# Patient Record
(demographics unavailable — no encounter records)

---

## 2017-06-06 NOTE — ER DOCUMENT REPORT
HPI





- HPI


Patient complains to provider of: laceration


Pain Level: 2


Context: 


patient is a 12 year old male who presents with laceration to the right eyebrow

/ atinet ran into another kid on the play ground. no LOC, h/a, n/v, AMS. UTD on 

vaccinations, pcp is dr solorio





- CARDIOVASCULAR


Cardiovascular: DENIES: Chest pain





- DERM


Skin Color: Normal





Past Medical History





- Social History


Smoking Status: Never Smoker


Chew tobacco use (# tins/day): No


Frequency of alcohol use: None


Drug Abuse: None


Family History: Reviewed & Not Pertinent


Patient has suicidal ideation: No


Patient has homicidal ideation: No


Renal/ Medical History: Denies: Hx Peritoneal Dialysis


Surgical Hx: Negative





- Immunizations


Immunizations up to date: Yes





Vertical Provider Document





- CONSTITUTIONAL


Agree With Documented VS: Yes


Exam Limitations: No Limitations


General Appearance: WD/WN, No Apparent Distress





- INFECTION CONTROL


TRAVEL OUTSIDE OF THE [place] IN LAST 30 DAYS: No





- HEENT


HEENT: Normocephalic





- NECK


Neck: Normal Inspection, Other - full ROM, no cervical motion tenderness





- RESPIRATORY


O2 Sat by Pulse Oximetry: 98





- NEURO


Level of Consciousness: Awake - GCS 15, Alert, Appropriate


Motor/Sensory: No Motor Deficit, No Sensory Deficit





- DERM


Integumentary: Warm, Dry, No Rash, Laceration - 2.5 cm, bleeding stopped. past 

dermis





Course





- Re-evaluation


Re-evalutation: 





06/06/17 16:28


patient is a 12 year old male with laceration, cleaned with saline and  

surgical prep. closed with 6.0 nylon, 3 interrupted suture. will f/u this 

weekend for suture removal





- Vital Signs


Vital signs: 


 











Temp Pulse Resp BP Pulse Ox


 


 97.8 F   73   19   133/82 H  98 


 


 06/06/17 14:49  06/06/17 14:49  06/06/17 14:49  06/06/17 14:49  06/06/17 14:49














Procedures





- Laceration/Wound Repair


  ** Face


Wound length (cm): 2.5


Wound's Depth, Shape: Linear


Laceration pre-procedure: Sterile PPE donned, Sterile drapes applied


Anesthetic type: Other - 5ml let


Wound explored: Clean, No foreign body removed


Irrigated w/ Saline (mLs): 100


Wound Debrided: Minimal


Wound Repaired With: Sutures


Suture Size/Type: 6:0, Nylon


Number of Sutures: 3


Layer Closure?: No


Post-procedure wound care: Sterile dressing applied


Post-procedure NV exam normal: Yes


Complications: No





Discharge





- Discharge


Clinical Impression: 


 Laceration





Condition: Good


Disposition: HOME, SELF-CARE


Instructions:  Antibiotic Ointment Protection ([facility]), Laceration Care ([facility]), 

Soap Cleansing ([facility])


Additional Instructions: 


Please see your pediatrician in 3-5 days to have the stitches removed


Forms:  Parent Work Note, Return to School


Referrals: 


ALEX SOLORIO MD [Primary Care Provider] - Follow up in 3-5 days

## 2017-06-06 NOTE — ER DOCUMENT REPORT
ED Medical Screen (RME)





- General


Chief Complaint: Laceration


Stated Complaint: HEAD INJURY/RIGHT EYEBROW LACERATION


Time Seen by Provider: 06/06/17 15:03


Notes: 


12-year-old male with laceration to right eyebrow that occurred just prior to 

arrival.  Patient hit heads with another student causing a laceration.  No loss 

of consciousness.  Injury.  No neck pain.


TRAVEL OUTSIDE OF THE [place] IN LAST 30 DAYS: No





Past Medical History


Renal/ Medical History: Denies: Hx Peritoneal Dialysis





Physical Exam





- Vital signs


Vitals: 


 











Temp Pulse Resp BP Pulse Ox


 


 97.8 F   73   19   133/82 H  98 


 


 06/06/17 14:49  06/06/17 14:49  06/06/17 14:49  06/06/17 14:49  06/06/17 14:49














- Notes


Notes: 


Exam shows a linear approximate 1.5 cm laceration to the right eyebrow 

extending into subcutaneous tissues.  The eye is unaffected itself.





Course





- Vital Signs


Vital signs: 


 











Temp Pulse Resp BP Pulse Ox


 


 97.8 F   73   19   133/82 H  98 


 


 06/06/17 14:49  06/06/17 14:49  06/06/17 14:49  06/06/17 14:49  06/06/17 14:49